# Patient Record
Sex: MALE | Race: WHITE
[De-identification: names, ages, dates, MRNs, and addresses within clinical notes are randomized per-mention and may not be internally consistent; named-entity substitution may affect disease eponyms.]

---

## 2022-11-09 ENCOUNTER — APPOINTMENT (OUTPATIENT)
Dept: ORTHOPEDIC SURGERY | Facility: CLINIC | Age: 67
End: 2022-11-09

## 2022-11-09 VITALS — BODY MASS INDEX: 33.6 KG/M2 | HEIGHT: 71 IN | WEIGHT: 240 LBS

## 2022-11-09 DIAGNOSIS — I10 ESSENTIAL (PRIMARY) HYPERTENSION: ICD-10-CM

## 2022-11-09 DIAGNOSIS — E78.00 PURE HYPERCHOLESTEROLEMIA, UNSPECIFIED: ICD-10-CM

## 2022-11-09 DIAGNOSIS — Z00.00 ENCOUNTER FOR GENERAL ADULT MEDICAL EXAMINATION W/OUT ABNORMAL FINDINGS: ICD-10-CM

## 2022-11-09 DIAGNOSIS — M19.90 UNSPECIFIED OSTEOARTHRITIS, UNSPECIFIED SITE: ICD-10-CM

## 2022-11-09 PROCEDURE — 73110 X-RAY EXAM OF WRIST: CPT | Mod: 50

## 2022-11-09 PROCEDURE — 20605 DRAIN/INJ JOINT/BURSA W/O US: CPT | Mod: LT

## 2022-11-09 PROCEDURE — L3908: CPT | Mod: RT

## 2022-11-09 PROCEDURE — 99214 OFFICE O/P EST MOD 30 MIN: CPT | Mod: 25

## 2022-11-09 NOTE — ASSESSMENT
[FreeTextEntry1] : We discussed the pathology of the condition in layman's terms.  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  We cannot go back in time and restore the ligament and cartilage, so our goal is to help with pain.  In that respect, options include bracing, medicine, injections and surgery.  The surgeries are for pain and other than neurectomy are considered salvage procedures.\par \par Medium joint injection was performed of the left and right wrist. The indication for this procedure was pain and inflammation. The site was prepped with alcohol and ethyl chloride sprayed topically. An injection of Lidocaine 1cc of 1%  was used Betamethasone (Celestone) 1cc of 6mg.  Patient was advised to call if redness, pain or fever occur and apply ice for 15 minutes out of every hour for the next 12-24 hours as tolerated. The risks benefits, and alternatives have been discussed, and verbal consent was obtained\par \par Pt will wear wrist braces at all times\par Pt was given CSI in b/l wrist joints\par F/u in 2 wks

## 2022-11-09 NOTE — IMAGING
[de-identified] : b/l wrists:\par deformity\par swelling\par ttp about wrist\par dorsal flexion 30\par volar flexion 10\par nvid [Bilateral] : wrists bilaterally [FreeTextEntry8] : [facility] wrist

## 2022-11-09 NOTE — HISTORY OF PRESENT ILLNESS
[Sleep] : sleep [de-identified] : 11/9/22:  Pt has deformity and pain of both wrists.  Pt has n/t in b/l hands that wake him from sleep. [] : no [FreeTextEntry1] : b/l hands  [FreeTextEntry3] : 2-3 weeks [FreeTextEntry5] : patient has swelling in both wrists and is developing pain at night. Right side is worse  [FreeTextEntry7] : into the arms

## 2022-11-30 ENCOUNTER — APPOINTMENT (OUTPATIENT)
Dept: ORTHOPEDIC SURGERY | Facility: CLINIC | Age: 67
End: 2022-11-30

## 2022-11-30 VITALS — BODY MASS INDEX: 33.6 KG/M2 | WEIGHT: 240 LBS | HEIGHT: 71 IN

## 2022-11-30 DIAGNOSIS — G56.02 CARPAL TUNNEL SYNDROME, LEFT UPPER LIMB: ICD-10-CM

## 2022-11-30 DIAGNOSIS — G56.01 CARPAL TUNNEL SYNDROME, RIGHT UPPER LIMB: ICD-10-CM

## 2022-11-30 DIAGNOSIS — M19.131 POST-TRAUMATIC OSTEOARTHRITIS, RIGHT WRIST: ICD-10-CM

## 2022-11-30 DIAGNOSIS — M19.132 POST-TRAUMATIC OSTEOARTHRITIS, LEFT WRIST: ICD-10-CM

## 2022-11-30 DIAGNOSIS — M25.831 OTHER SPECIFIED JOINT DISORDERS, RIGHT WRIST: ICD-10-CM

## 2022-11-30 PROCEDURE — 20612 ASPIRATE/INJ GANGLION CYST: CPT

## 2022-11-30 PROCEDURE — 99214 OFFICE O/P EST MOD 30 MIN: CPT | Mod: 25

## 2022-11-30 NOTE — IMAGING
[Bilateral] : wrists bilaterally [de-identified] : b/l wrists:\par deformity\par swelling\par ttp about wrist\par dorsal flexion 30\par volar flexion 10\par nvid [FreeTextEntry8] : [facility] wrist

## 2022-11-30 NOTE — HISTORY OF PRESENT ILLNESS
[Sleep] : sleep [de-identified] : 11/30/22:  Pt still has wrist pain and intermittent n/t.\par \par 11/9/22:  Pt has deformity and pain of both wrists.  Pt has n/t in b/l hands that wake him from sleep. [] : no [FreeTextEntry1] : b/l hands  [FreeTextEntry3] : 2-3 weeks [FreeTextEntry5] : patient has swelling in both wrists and is developing pain at night. Right side is worse  [FreeTextEntry7] : into the arms

## 2022-11-30 NOTE — ASSESSMENT
[FreeTextEntry1] : We discussed the pathology of the condition in layman's terms.  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  We cannot go back in time and restore the ligament and cartilage, so our goal is to help with pain.  In that respect, options include bracing, medicine, injections and surgery.  The surgeries are for pain and other than neurectomy are considered salvage procedures.\par \par Pt will continue to wear brace\par \par Pt will go for EMG\par F/u after testing\par

## 2022-12-27 ENCOUNTER — APPOINTMENT (OUTPATIENT)
Dept: NEUROLOGY | Facility: CLINIC | Age: 67
End: 2022-12-27

## 2023-01-10 ENCOUNTER — APPOINTMENT (OUTPATIENT)
Dept: NEUROLOGY | Facility: CLINIC | Age: 68
End: 2023-01-10

## 2023-01-11 ENCOUNTER — APPOINTMENT (OUTPATIENT)
Dept: ORTHOPEDIC SURGERY | Facility: CLINIC | Age: 68
End: 2023-01-11

## 2023-02-08 ENCOUNTER — APPOINTMENT (OUTPATIENT)
Dept: ORTHOPEDIC SURGERY | Facility: CLINIC | Age: 68
End: 2023-02-08

## 2023-07-21 ENCOUNTER — APPOINTMENT (OUTPATIENT)
Dept: PULMONOLOGY | Facility: CLINIC | Age: 68
End: 2023-07-21

## 2024-01-22 ENCOUNTER — APPOINTMENT (OUTPATIENT)
Dept: ORTHOPEDIC SURGERY | Facility: CLINIC | Age: 69
End: 2024-01-22
Payer: MEDICARE

## 2024-01-22 VITALS — HEIGHT: 70 IN | BODY MASS INDEX: 32.5 KG/M2 | WEIGHT: 227 LBS

## 2024-01-22 DIAGNOSIS — S46.012D STRAIN OF MUSCLE(S) AND TENDON(S) OF THE ROTATOR CUFF OF LEFT SHOULDER, SUBSEQUENT ENCOUNTER: ICD-10-CM

## 2024-01-22 PROCEDURE — 73030 X-RAY EXAM OF SHOULDER: CPT | Mod: LT

## 2024-01-22 PROCEDURE — 73010 X-RAY EXAM OF SHOULDER BLADE: CPT | Mod: LT

## 2024-01-22 PROCEDURE — 99213 OFFICE O/P EST LOW 20 MIN: CPT

## 2024-01-22 RX ORDER — ZOLPIDEM TARTRATE 5 MG/1
5 TABLET ORAL
Refills: 0 | Status: ACTIVE | COMMUNITY

## 2024-01-22 RX ORDER — AMLODIPINE BESYLATE AND BENAZEPRIL HYDROCHLORIDE 5; 20 MG/1; MG/1
5-20 CAPSULE ORAL
Refills: 0 | Status: ACTIVE | COMMUNITY

## 2024-01-22 RX ORDER — METOPROLOL TARTRATE 25 MG/1
25 TABLET, FILM COATED ORAL
Refills: 0 | Status: ACTIVE | COMMUNITY

## 2024-01-22 RX ORDER — ROPINIROLE 4 MG/1
4 TABLET, FILM COATED ORAL
Refills: 0 | Status: ACTIVE | COMMUNITY

## 2024-01-22 RX ORDER — ATORVASTATIN CALCIUM 10 MG/1
10 TABLET, FILM COATED ORAL
Refills: 0 | Status: ACTIVE | COMMUNITY

## 2024-01-22 RX ORDER — GABAPENTIN 300 MG/1
300 CAPSULE ORAL
Refills: 0 | Status: ACTIVE | COMMUNITY

## 2024-01-22 NOTE — IMAGING
[de-identified] : No swelling, no ecchymosis, no janak deformity Tenderness to palpation over greater tuberosity No instability or tenderness over AC joint Acitve FF 90, Passive 170 3/5 supraspinatus, infraspinatus and subscapularis; there is pain with strength testing Positive Mcgowan test, positive impingement sign Speeds and yergason negative Motor and sensory intact distally [There are no fractures, subluxations or dislocations. No significant abnormalities are seen] : There are no fractures, subluxations or dislocations. 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

## 2024-01-22 NOTE — ASSESSMENT
[FreeTextEntry1] : 1 year of progressively worsening shoulder weakness.  No significant pain on exam today.  No night symptoms.  He does a lot of heavy lifting and exam suggests a degenerative worsening rotator cuff tear.  Given minimal pain and no night symptoms I advised course of physical therapy for anterior deltoid rehabilitation.  Explained no surgery advised unless there is significant pain.  Most of these tears are degenerative in nature and do well conservatively.  Consider corticosteroid injection if pain worsens possibly MRI to assess true rotator cuff status.

## 2024-01-22 NOTE — HISTORY OF PRESENT ILLNESS
[Left Arm] : left arm [Sudden] : sudden [8] : 8 [7] : 7 [Localized] : localized [Tightness] : tightness [Intermittent] : intermittent [Household chores] : household chores [Rest] : rest [de-identified] : 01/22/24 pt presents here today with left shoulder pain for about1 year h/o falling no treatment so far  pt is a left handed  limited ROM [] : no [FreeTextEntry1] : left shoulder  [de-identified] : WITH ACTIVITY [de-identified] : NOTHING

## 2024-03-04 ENCOUNTER — APPOINTMENT (OUTPATIENT)
Dept: ORTHOPEDIC SURGERY | Facility: CLINIC | Age: 69
End: 2024-03-04

## 2024-10-12 ENCOUNTER — EMERGENCY (EMERGENCY)
Facility: HOSPITAL | Age: 69
LOS: 1 days | Discharge: LEFT BEFORE DISCHARGE | End: 2024-10-12
Admitting: EMERGENCY MEDICINE

## 2024-10-12 VITALS
HEIGHT: 70 IN | HEART RATE: 99 BPM | RESPIRATION RATE: 18 BRPM | WEIGHT: 179.9 LBS | SYSTOLIC BLOOD PRESSURE: 117 MMHG | OXYGEN SATURATION: 97 % | TEMPERATURE: 99 F | DIASTOLIC BLOOD PRESSURE: 76 MMHG

## 2024-10-12 PROCEDURE — L9992: CPT
